# Patient Record
Sex: MALE | Race: BLACK OR AFRICAN AMERICAN | URBAN - NONMETROPOLITAN AREA
[De-identification: names, ages, dates, MRNs, and addresses within clinical notes are randomized per-mention and may not be internally consistent; named-entity substitution may affect disease eponyms.]

---

## 2017-09-21 ENCOUNTER — OFFICE VISIT - GICH (OUTPATIENT)
Dept: FAMILY MEDICINE | Facility: OTHER | Age: 22
End: 2017-09-21

## 2017-09-21 ENCOUNTER — HISTORY (OUTPATIENT)
Dept: FAMILY MEDICINE | Facility: OTHER | Age: 22
End: 2017-09-21

## 2017-09-21 DIAGNOSIS — S83.421A SPRAIN OF LATERAL COLLATERAL LIGAMENT OF RIGHT KNEE: ICD-10-CM

## 2017-12-28 NOTE — PROGRESS NOTES
"Patient Information     Patient Name MRN Evan Oh [id number] Male 1995      Progress Notes by Gordo Swartz MD at 2017  3:30 PM     Author:  Gordo Swartz MD Service:  (none) Author Type:  Physician     Filed:  2017  3:46 PM Encounter Date:  2017 Status:  Signed     :  Gordo Swartz MD (Physician)            SUBJECTIVE:  Evan Galvan is a 22 y.o. male here for right knee pain. Patient reports he was playing guard on Saturday when his teammate him from the outside of his right knee. He had immediate pain and inability to fully straighten his leg. He is unable to report that the rest of the day.  Since that time he has used some ice and anti-inflammatories and has had resolution of his range of motion problems. He has had occasional pain and also his knee but overall feels much better. He has had no preceding right knee pain. No mechanical symptoms of giving out or locking.    ROS:    As above otherwise ROS is unremarkable.    OBJECTIVE:  /92  Pulse (!) 102  Ht 1.765 m (5' 9.5\")  Wt 130.3 kg (287 lb 3.2 oz)  BMI 41.8 kg/m2    EXAM:  General Appearance: Pleasant, alert, appropriate appearance for age. No acute distress  Musculoskeletal: Right knee shows normal range of motion. He has normal varus, valgus, anterior drawer and Lachman's. Negative Jamari's. He has mild tenderness to palpation along his lateral collateral ligament. No swelling is noted in his bursa area  Skin: No bruising or erythema.    ASSESSEMENT AND PLAN:    Evan was seen today for knee pain/problem.    Diagnoses and all orders for this visit:    Sprain of lateral collateral ligament of right knee, initial encounter  -     KNEE BRACE     suspect he has a mild LCL sprain or likely bruised from the direct trauma. Recommend short runners brace to be used for support, ice and anti-inflammatories for the next couple of weeks. Discussed return to play recommendations. Follow-up if he has worsening " symptoms.      Roverto Swartz MD    This document was prepared using voice generated software.  While every attempt was made for accuracy, grammatical errors may exist.

## 2017-12-30 NOTE — NURSING NOTE
Patient Information     Patient Name MRN Evan Oh [id number] Male 1995      Nursing Note by Ann Marie Manning at 2017  3:30 PM     Author:  Ann Marie Manning Service:  (none) Author Type:  (none)     Filed:  2017  3:46 PM Encounter Date:  2017 Status:  Signed     :  Ann Marie Manning            Patient presents today with complaints of a LCL injury to his right knee. Patient states he injured it 5 days ago playing football.  Ann Marie Manning LPN .............2017  3:30 PM

## 2018-01-27 VITALS
WEIGHT: 287.2 LBS | SYSTOLIC BLOOD PRESSURE: 139 MMHG | DIASTOLIC BLOOD PRESSURE: 92 MMHG | HEART RATE: 102 BPM | BODY MASS INDEX: 41.12 KG/M2 | HEIGHT: 70 IN

## 2018-03-06 ENCOUNTER — DOCUMENTATION ONLY (OUTPATIENT)
Dept: FAMILY MEDICINE | Facility: OTHER | Age: 23
End: 2018-03-06